# Patient Record
Sex: MALE | Race: WHITE | Employment: FULL TIME | URBAN - METROPOLITAN AREA
[De-identification: names, ages, dates, MRNs, and addresses within clinical notes are randomized per-mention and may not be internally consistent; named-entity substitution may affect disease eponyms.]

---

## 2021-03-24 ENCOUNTER — HOSPITAL ENCOUNTER (EMERGENCY)
Age: 51
Discharge: HOME | End: 2021-03-24
Attending: EMERGENCY MEDICINE
Payer: COMMERCIAL

## 2021-03-24 VITALS
RESPIRATION RATE: 16 BRPM | HEART RATE: 74 BPM | WEIGHT: 315 LBS | SYSTOLIC BLOOD PRESSURE: 138 MMHG | TEMPERATURE: 98.7 F | BODY MASS INDEX: 39.17 KG/M2 | HEIGHT: 75 IN | DIASTOLIC BLOOD PRESSURE: 84 MMHG | OXYGEN SATURATION: 98 %

## 2021-03-24 DIAGNOSIS — S91.209A AVULSION OF TOENAIL, INITIAL ENCOUNTER: Primary | ICD-10-CM

## 2021-03-24 PROCEDURE — 90715 TDAP VACCINE 7 YRS/> IM: CPT | Performed by: EMERGENCY MEDICINE

## 2021-03-24 PROCEDURE — 2500000003 HC RX 250 WO HCPCS: Performed by: EMERGENCY MEDICINE

## 2021-03-24 PROCEDURE — 6360000002 HC RX W HCPCS: Performed by: EMERGENCY MEDICINE

## 2021-03-24 PROCEDURE — 11730 AVULSION NAIL PLATE SIMPLE 1: CPT

## 2021-03-24 PROCEDURE — 99282 EMERGENCY DEPT VISIT SF MDM: CPT

## 2021-03-24 PROCEDURE — 90471 IMMUNIZATION ADMIN: CPT | Performed by: EMERGENCY MEDICINE

## 2021-03-24 RX ORDER — LIDOCAINE HYDROCHLORIDE 20 MG/ML
5 INJECTION, SOLUTION INFILTRATION; PERINEURAL ONCE
Status: COMPLETED | OUTPATIENT
Start: 2021-03-24 | End: 2021-03-24

## 2021-03-24 RX ADMIN — TETANUS TOXOID, REDUCED DIPHTHERIA TOXOID AND ACELLULAR PERTUSSIS VACCINE, ADSORBED 0.5 ML: 5; 2.5; 8; 8; 2.5 SUSPENSION INTRAMUSCULAR at 22:31

## 2021-03-24 RX ADMIN — LIDOCAINE HYDROCHLORIDE 5 ML: 20 INJECTION, SOLUTION INFILTRATION; PERINEURAL at 22:30

## 2021-03-24 ASSESSMENT — ENCOUNTER SYMPTOMS
WHEEZING: 0
PHOTOPHOBIA: 0
SHORTNESS OF BREATH: 0
VOMITING: 0
COLOR CHANGE: 0
RHINORRHEA: 0
BACK PAIN: 0
NAUSEA: 0

## 2021-03-25 NOTE — ED NOTES
Pt dc/d with instructions in stable condition, ambulatory to lobby. Home per ride.       Clarice Barron RN  03/24/21 9867

## 2021-03-25 NOTE — ED PROVIDER NOTES
[address] [facility]  EMERGENCY DEPARTMENTENCOUNTER      Pt Name: Adilia Max  MRN: [id number]  [place] 1970  Date ofevaluation: 3/24/2021  Provider: Ronal Roberts MD    CHIEF COMPLAINT       Chief Complaint   Patient presents with    Toe Injury     unroofed great toe nail left ft         HISTORY OF PRESENT ILLNESS   (Location/Symptom, Timing/Onset,Context/Setting, Quality, Duration, Modifying Factors, Severity)  Note limiting factors. Adilia Max is a 53 y.o. male  who  has a past medical history of Asthma. who presents to the emergency department for evaluation of an injury to the left first toe. Patient reports getting off his cat and striking his toe on an ottoman. He is almost completely avulsed the toenail of the left toe. Denies loss of sensation or motor function. Denies loss of movement. Denies pain at the interphalangeal or MTP joint. Denies ankle pain or knee pain. He is unsure when his last tetanus shot was. HPI    NursingNotes were reviewed. REVIEW OF SYSTEMS    (2-9 systems for level 4, 10 or more for level 5)     Review of Systems   Constitutional: Negative for activity change, chills and fever. HENT: Negative for congestion and rhinorrhea. Eyes: Negative for photophobia and visual disturbance. Respiratory: Negative for shortness of breath and wheezing. Cardiovascular: Negative for palpitations and leg swelling. Gastrointestinal: Negative for nausea and vomiting. Endocrine: Negative for polydipsia and polyuria. Genitourinary: Negative for difficulty urinating and frequency. Musculoskeletal: Negative for back pain and gait problem. Skin: Positive for wound. Negative for color change and rash. Neurological: Negative for light-headedness and headaches. Psychiatric/Behavioral: Negative for confusion. The patient is not nervous/anxious. All other systems reviewed and are negative.       Except as noted above the remainder of the review of systems was reviewed and negative. PAST MEDICAL HISTORY     Past Medical History:   Diagnosis Date    Asthma          SURGICALHISTORY       Past Surgical History:   Procedure Laterality Date    SHOULDER SURGERY       Right rotator cuff         CURRENT MEDICATIONS       Previous Medications    No medications on file            Penicillins    FAMILY HISTORY     History reviewed. No pertinent family history. SOCIAL HISTORY       Social History     Socioeconomic History    Marital status:      Spouse name: None    Number of children: None    Years of education: None    Highest education level: None   Occupational History    None   Social Needs    Financial resource strain: None    Food insecurity     Worry: None     Inability: None    Transportation needs     Medical: None     Non-medical: None   Tobacco Use    Smoking status: Never Smoker    Smokeless tobacco: Never Used   Substance and Sexual Activity    Alcohol use: Yes     Comment: rare    Drug use: None    Sexual activity: None   Lifestyle    Physical activity     Days per week: None     Minutes per session: None    Stress: None   Relationships    Social connections     Talks on phone: None     Gets together: None     Attends Voodoo service: None     Active member of club or organization: None     Attends meetings of clubs or organizations: None     Relationship status: None    Intimate partner violence     Fear of current or ex partner: None     Emotionally abused: None     Physically abused: None     Forced sexual activity: None   Other Topics Concern    None   Social History Narrative    None       SCREENINGS             PHYSICAL EXAM    (up to 7 for level 4, 8 or more for level 5)     ED Triage Vitals [03/24/21 2156]   BP Temp Temp Source Pulse Resp SpO2 Height Weight   138/84 98.7 °F (37.1 °C) Oral 74 16 98 % 6' 3\" (1.905 m) (!) 350 lb 9.6 oz (159 kg)       Physical Exam  Vitals signs and nursing note reviewed. Constitutional:       General: He is not in acute distress. Appearance: He is well-developed. HENT:      Head: Normocephalic and atraumatic. Eyes:      Conjunctiva/sclera: Conjunctivae normal.   Neck:      Musculoskeletal: Normal range of motion. Trachea: No tracheal deviation. Cardiovascular:      Rate and Rhythm: Normal rate and regular rhythm. Pulmonary:      Effort: Pulmonary effort is normal.      Breath sounds: Normal breath sounds. No wheezing or rales. Abdominal:      General: There is no distension. Palpations: Abdomen is soft. Tenderness: There is no abdominal tenderness. Musculoskeletal: Normal range of motion. General: Tenderness and signs of injury present. No deformity. Comments: Nail avulsion of the first toe of the left foot. Skin:     General: Skin is warm and dry. Capillary Refill: Capillary refill takes less than 2 seconds. Coloration: Skin is not pale. Findings: Lesion present. No erythema. Neurological:      Mental Status: He is alert and oriented to person, place, and time. RESULTS     EKG: All EKG's are interpreted by the Emergency Department Physician who either signs or Co-signsthis chart in the absence of a cardiologist.      RADIOLOGY:   Tamanna [place] such as CT, Ultrasound and MRI are read by the radiologist. Plain radiographic images are visualized and preliminarily interpreted by the emergency physician with the below findings:      Interpretation per the Radiologist below, if available at the time ofthis note:    No orders to display         ED BEDSIDE ULTRASOUND:   Performed by ED Physician - none    LABS:  Labs Reviewed - No data to display    All other labs were within normal range or not returned as of this dictation.     EMERGENCY DEPARTMENT COURSE and DIFFERENTIAL DIAGNOSIS/MDM:   Vitals:    Vitals:    03/24/21 2156   BP: 138/84   Pulse: 74   Resp: 16   Temp: 98.7 °F (37.1 °C)   TempSrc: Oral   SpO2: procedures. There was a high probability ofclinically significant/life threatening deterioration in the patient's condition which required my urgent intervention. CONSULTS:  None    PROCEDURES:  Unless otherwise noted below, none     Nail Removal    Date/Time: 3/24/2021 11:27 PM  Performed by: Sharon Rosa MD  Authorized by: Sharon Rosa MD     Consent:     Consent obtained:  Verbal    Consent given by:  Patient    Risks discussed:  Bleeding, incomplete removal, infection, pain and permanent nail deformity    Alternatives discussed:  No treatment and delayed treatment  Location:     Foot:  L big toe  Pre-procedure details:     Skin preparation:  Alcohol    Preparation: Patient was prepped and draped in the usual sterile fashion    Anesthesia (see MAR for exact dosages): Anesthesia method:  Nerve block    Block needle gauge:  30 G    Block anesthetic:  Lidocaine 1% w/o epi    Block technique:  Digitial    Block injection procedure:  Anatomic landmarks identified, introduced needle and incremental injection    Block outcome:  Anesthesia achieved  Nail Removal:     Nail removed:  Partial    Nail bed repaired: yes      Nail bed repair material:  Tissue adhesive    Removed nail replaced and anchored: no    Trephination:     Subungual hematoma drained: no    Ingrown nail:     Wedge excision of skin: no      Nail matrix removed or ablated:  None  Post-procedure details:     Dressing:  Tube gauze and non-adhesive packing strip    Patient tolerance of procedure: Tolerated well, no immediate complications        FINAL IMPRESSION      1.  Avulsion of toenail, initial encounter          DISPOSITION/PLAN   DISPOSITION Decision To Discharge 03/24/2021 10:55:24 PM      PATIENT REFERREDTO:  [facility]) Pre-Services  [phone]          DISCHARGEMEDICATIONS:  New Prescriptions    No medications on file          (Please note that portions of this note were completed with a voice recognition program.  Efforts were made to edit the dictations but occasionally words are mis-transcribed.)    Ashwini Hernandez MD (electronically signed)  Attending Emergency Physician          Ashwini Hernandez MD  03/24/21 Naldo Monahan MD  03/24/21 [id number]

## 2022-04-04 LAB
CHOLESTEROL, TOTAL: 134 MG/DL (ref 0–199)
GLUCOSE BLD-MCNC: 97 MG/DL (ref 70–99)
HDLC SERPL-MCNC: 47 MG/DL (ref 40–60)
LDL CHOLESTEROL CALCULATED: 79 MG/DL
TRIGL SERPL-MCNC: 42 MG/DL (ref 0–150)

## 2022-07-29 LAB
CHOLESTEROL, TOTAL: 122 MG/DL (ref 0–199)
GLUCOSE BLD-MCNC: 95 MG/DL (ref 70–99)
HDLC SERPL-MCNC: 52 MG/DL (ref 40–60)
LDL CHOLESTEROL CALCULATED: 63 MG/DL
TRIGL SERPL-MCNC: 37 MG/DL (ref 0–150)

## 2023-07-28 LAB
CHOLEST SERPL-MCNC: 129 MG/DL (ref 0–199)
GLUCOSE SERPL-MCNC: 53 MG/DL (ref 70–99)
HDLC SERPL-MCNC: 49 MG/DL (ref 40–60)
LDLC SERPL CALC-MCNC: 68 MG/DL
TRIGL SERPL-MCNC: 60 MG/DL (ref 0–150)

## 2024-04-03 LAB
CHOLEST SERPL-MCNC: 140 MG/DL (ref 0–199)
GLUCOSE SERPL-MCNC: 99 MG/DL (ref 70–99)
HDLC SERPL-MCNC: 53 MG/DL (ref 40–60)
LDLC SERPL CALC-MCNC: 75 MG/DL
TRIGL SERPL-MCNC: 59 MG/DL (ref 0–150)

## 2025-03-06 LAB
CHOLEST SERPL-MCNC: 143 MG/DL (ref 0–199)
GLUCOSE SERPL-MCNC: 111 MG/DL (ref 70–99)
HDLC SERPL-MCNC: 48 MG/DL (ref 40–60)
LDLC SERPL CALC-MCNC: 84 MG/DL
TRIGL SERPL-MCNC: 56 MG/DL (ref 0–150)

## 2025-04-07 SDOH — HEALTH STABILITY: PHYSICAL HEALTH: ON AVERAGE, HOW MANY DAYS PER WEEK DO YOU ENGAGE IN MODERATE TO STRENUOUS EXERCISE (LIKE A BRISK WALK)?: 5 DAYS

## 2025-04-07 SDOH — HEALTH STABILITY: PHYSICAL HEALTH: ON AVERAGE, HOW MANY MINUTES DO YOU ENGAGE IN EXERCISE AT THIS LEVEL?: 80 MIN

## 2025-04-08 ASSESSMENT — ENCOUNTER SYMPTOMS
VOMITING: 0
NAUSEA: 0
DIARRHEA: 0
CONSTIPATION: 0
SHORTNESS OF BREATH: 0

## 2025-04-08 NOTE — PROGRESS NOTES
[facility]  Family and [facility]  [address], [address], [place] [address]  Phone: [phone]                                   Name:  Brian Brewer  :    1970    HPI:     Brian Brewer is a 54 y.o. male who  has a past medical history of Asthma. He presents today to establish care. Chronic conditions and acute concerns discussed below. New patient history was obtained.    Concern for low testosterone  -Decrease in energy and libido over last year  -Sometimes difficulty getting an erection but no other issues    Concern for sleep apnea  -Snoring per wife  -Sometimes feels like he could fall asleep during the day  -Some trouble staying asleep  -Gets up to pee once a night    BMI 43  -Gym 4-5 days a week, 20-30 minutes of cardio a few days a week and lifting  -Protein yogurt and banana for breakfast  -Chicken salad or caesar salad for lunch  -Half of the week healthy dinner, sometimes eating out  -Eating lots of sweets    Weaker urinary stream  -Urinary stream is not as strong  -Dribbling  -No urinary urgency  -No sensation of incomplete emptying    Past Medical History:    Past Medical History:   Diagnosis Date    Asthma      Past Surgical History:  Past Surgical History:   Procedure Laterality Date    SHOULDER SURGERY       Right rotator cuff     Home Meds:  Prior to Visit Medications    Medication Sig Taking? Authorizing Provider   Semaglutide-Weight Management (WEGOVY) 0.25 MG/0.5ML SOAJ SC injection Inject 0.25 mg into the skin every 7 days Yes Gary Georges,    albuterol sulfate HFA (PROVENTIL;VENTOLIN;PROAIR) 108 (90 Base) MCG/ACT inhaler Inhale 2 puffs into the lungs as needed  Provider, MD Rossi     Allergies:    Penicillins  Family History:       Problem Relation Age of Onset    Arthritis Mother     Diabetes Mother     Heart Disease Mother     High Blood Pressure Mother      Social History:  Social History       Tobacco History

## 2025-04-10 ENCOUNTER — OFFICE VISIT (OUTPATIENT)
Dept: PRIMARY CARE CLINIC | Age: 55
End: 2025-04-10

## 2025-04-10 VITALS
OXYGEN SATURATION: 95 % | HEIGHT: 75 IN | BODY MASS INDEX: 39.17 KG/M2 | HEART RATE: 71 BPM | SYSTOLIC BLOOD PRESSURE: 122 MMHG | RESPIRATION RATE: 16 BRPM | DIASTOLIC BLOOD PRESSURE: 78 MMHG | WEIGHT: 315 LBS

## 2025-04-10 DIAGNOSIS — Z13.21 SCREENING FOR ENDOCRINE, NUTRITIONAL, METABOLIC AND IMMUNITY DISORDER: ICD-10-CM

## 2025-04-10 DIAGNOSIS — Z13.29 SCREENING FOR ENDOCRINE, NUTRITIONAL, METABOLIC AND IMMUNITY DISORDER: ICD-10-CM

## 2025-04-10 DIAGNOSIS — R68.82 DECREASED LIBIDO: ICD-10-CM

## 2025-04-10 DIAGNOSIS — R06.83 SNORING: ICD-10-CM

## 2025-04-10 DIAGNOSIS — R39.12 WEAK URINARY STREAM: ICD-10-CM

## 2025-04-10 DIAGNOSIS — Z76.89 ENCOUNTER TO ESTABLISH CARE: ICD-10-CM

## 2025-04-10 DIAGNOSIS — R53.83 OTHER FATIGUE: ICD-10-CM

## 2025-04-10 DIAGNOSIS — Z12.11 SCREEN FOR COLON CANCER: ICD-10-CM

## 2025-04-10 DIAGNOSIS — Z00.00 HEALTHCARE MAINTENANCE: ICD-10-CM

## 2025-04-10 DIAGNOSIS — Z13.0 SCREENING FOR ENDOCRINE, NUTRITIONAL, METABOLIC AND IMMUNITY DISORDER: ICD-10-CM

## 2025-04-10 DIAGNOSIS — Z13.228 SCREENING FOR ENDOCRINE, NUTRITIONAL, METABOLIC AND IMMUNITY DISORDER: ICD-10-CM

## 2025-04-10 RX ORDER — ALBUTEROL SULFATE 90 UG/1
2 INHALANT RESPIRATORY (INHALATION) PRN
COMMUNITY

## 2025-04-10 SDOH — ECONOMIC STABILITY: FOOD INSECURITY: WITHIN THE PAST 12 MONTHS, YOU WORRIED THAT YOUR FOOD WOULD RUN OUT BEFORE YOU GOT MONEY TO BUY MORE.: NEVER TRUE

## 2025-04-10 SDOH — ECONOMIC STABILITY: FOOD INSECURITY: WITHIN THE PAST 12 MONTHS, THE FOOD YOU BOUGHT JUST DIDN'T LAST AND YOU DIDN'T HAVE MONEY TO GET MORE.: NEVER TRUE

## 2025-04-10 ASSESSMENT — PATIENT HEALTH QUESTIONNAIRE - PHQ9
1. LITTLE INTEREST OR PLEASURE IN DOING THINGS: NOT AT ALL
2. FEELING DOWN, DEPRESSED OR HOPELESS: NOT AT ALL
SUM OF ALL RESPONSES TO PHQ QUESTIONS 1-9: 0

## 2025-04-14 ENCOUNTER — HOSPITAL ENCOUNTER (OUTPATIENT)
Age: 55
Discharge: HOME | End: 2025-04-14
Payer: COMMERCIAL

## 2025-04-14 ENCOUNTER — RESULTS FOLLOW-UP (OUTPATIENT)
Dept: PRIMARY CARE CLINIC | Age: 55
End: 2025-04-14

## 2025-04-14 DIAGNOSIS — G47.39 SLEEP APNEA-LIKE BEHAVIOR: ICD-10-CM

## 2025-04-14 DIAGNOSIS — Z91.89 AT RISK FOR SLEEP APNEA: ICD-10-CM

## 2025-04-14 DIAGNOSIS — Z13.0 SCREENING FOR ENDOCRINE, NUTRITIONAL, METABOLIC AND IMMUNITY DISORDER: ICD-10-CM

## 2025-04-14 DIAGNOSIS — Z13.228 SCREENING FOR ENDOCRINE, NUTRITIONAL, METABOLIC AND IMMUNITY DISORDER: ICD-10-CM

## 2025-04-14 DIAGNOSIS — Z13.29 SCREENING FOR ENDOCRINE, NUTRITIONAL, METABOLIC AND IMMUNITY DISORDER: ICD-10-CM

## 2025-04-14 DIAGNOSIS — R53.83 OTHER FATIGUE: ICD-10-CM

## 2025-04-14 DIAGNOSIS — R68.82 DECREASED LIBIDO: ICD-10-CM

## 2025-04-14 DIAGNOSIS — Z13.21 SCREENING FOR ENDOCRINE, NUTRITIONAL, METABOLIC AND IMMUNITY DISORDER: ICD-10-CM

## 2025-04-14 LAB
25(OH)D3 SERPL-MCNC: 51.2 NG/ML
ALBUMIN SERPL-MCNC: 4.1 G/DL (ref 3.4–5)
ALBUMIN/GLOB SERPL: 1.5 {RATIO} (ref 1.1–2.2)
ALP SERPL-CCNC: 77 U/L (ref 40–129)
ALT SERPL-CCNC: 27 U/L (ref 10–40)
ANION GAP SERPL CALCULATED.3IONS-SCNC: 9 MMOL/L (ref 3–16)
AST SERPL-CCNC: 23 U/L (ref 15–37)
BASOPHILS # BLD: 0 K/UL (ref 0–0.2)
BASOPHILS NFR BLD: 0.6 %
BILIRUB SERPL-MCNC: 1.1 MG/DL (ref 0–1)
BUN SERPL-MCNC: 17 MG/DL (ref 7–20)
CALCIUM SERPL-MCNC: 9.1 MG/DL (ref 8.3–10.6)
CHLORIDE SERPL-SCNC: 104 MMOL/L (ref 99–110)
CO2 SERPL-SCNC: 26 MMOL/L (ref 21–32)
CREAT SERPL-MCNC: 1.2 MG/DL (ref 0.9–1.3)
DEPRECATED RDW RBC AUTO: 14.1 % (ref 12.4–15.4)
EOSINOPHIL # BLD: 0.3 K/UL (ref 0–0.6)
EOSINOPHIL NFR BLD: 3.5 %
EST. AVERAGE GLUCOSE BLD GHB EST-MCNC: 102.5 MG/DL
FOLATE SERPL-MCNC: 9.32 NG/ML (ref 4.78–24.2)
GFR SERPLBLD CREATININE-BSD FMLA CKD-EPI: 72 ML/MIN/{1.73_M2}
GLUCOSE SERPL-MCNC: 95 MG/DL (ref 70–99)
HBA1C MFR BLD: 5.2 %
HCT VFR BLD AUTO: 47.4 % (ref 40.5–52.5)
HGB BLD-MCNC: 16.2 G/DL (ref 13.5–17.5)
LYMPHOCYTES # BLD: 1.6 K/UL (ref 1–5.1)
LYMPHOCYTES NFR BLD: 20.7 %
MCH RBC QN AUTO: 29.8 PG (ref 26–34)
MCHC RBC AUTO-ENTMCNC: 34.2 G/DL (ref 31–36)
MCV RBC AUTO: 87.1 FL (ref 80–100)
MONOCYTES # BLD: 0.8 K/UL (ref 0–1.3)
MONOCYTES NFR BLD: 10.3 %
NEUTROPHILS # BLD: 5 K/UL (ref 1.7–7.7)
NEUTROPHILS NFR BLD: 64.9 %
PLATELET # BLD AUTO: 233 K/UL (ref 135–450)
PMV BLD AUTO: 8.5 FL (ref 5–10.5)
POTASSIUM SERPL-SCNC: 4.1 MMOL/L (ref 3.5–5.1)
PROT SERPL-MCNC: 6.9 G/DL (ref 6.4–8.2)
RBC # BLD AUTO: 5.45 M/UL (ref 4.2–5.9)
SODIUM SERPL-SCNC: 139 MMOL/L (ref 136–145)
TSH SERPL DL<=0.005 MIU/L-ACNC: 3.13 UIU/ML (ref 0.27–4.2)
VIT B12 SERPL-MCNC: 376 PG/ML (ref 211–911)
WBC # BLD AUTO: 7.7 K/UL (ref 4–11)

## 2025-04-14 PROCEDURE — 82306 VITAMIN D 25 HYDROXY: CPT

## 2025-04-14 PROCEDURE — 36415 COLL VENOUS BLD VENIPUNCTURE: CPT

## 2025-04-14 PROCEDURE — 84403 ASSAY OF TOTAL TESTOSTERONE: CPT

## 2025-04-14 PROCEDURE — 84443 ASSAY THYROID STIM HORMONE: CPT

## 2025-04-14 PROCEDURE — 84270 ASSAY OF SEX HORMONE GLOBUL: CPT

## 2025-04-14 PROCEDURE — 82746 ASSAY OF FOLIC ACID SERUM: CPT

## 2025-04-14 PROCEDURE — 83036 HEMOGLOBIN GLYCOSYLATED A1C: CPT

## 2025-04-14 PROCEDURE — 80053 COMPREHEN METABOLIC PANEL: CPT

## 2025-04-14 PROCEDURE — 85025 COMPLETE CBC W/AUTO DIFF WBC: CPT

## 2025-04-14 PROCEDURE — 82607 VITAMIN B-12: CPT

## 2025-04-14 NOTE — RESULT ENCOUNTER NOTE
Please let patient know that his labs were normal.  Still waiting on testosterone level and will update once that result comes back.

## 2025-04-14 NOTE — RESULT ENCOUNTER NOTE
Noted.  Personally sent patient a message discussing new prescription of Zepbound.  Patient will update on whether insurance covers this or not and will let us know if he decides to pay for the Wegovy or Zepbound out-of-pocket.

## 2025-04-16 LAB
SHBG SERPL-SCNC: 35 NMOL/L (ref 19–76)
TESTOST FREE SERPL-MCNC: 107 PG/ML (ref 47–244)
TESTOST SERPL-MCNC: 524 NG/DL (ref 193–740)

## 2025-04-21 ASSESSMENT — SLEEP AND FATIGUE QUESTIONNAIRES
HOW LIKELY ARE YOU TO NOD OFF OR FALL ASLEEP WHILE SITTING AND READING: SLIGHT CHANCE OF DOZING
HOW LIKELY ARE YOU TO NOD OFF OR FALL ASLEEP WHILE SITTING INACTIVE IN A PUBLIC PLACE: WOULD NEVER DOZE
HOW LIKELY ARE YOU TO NOD OFF OR FALL ASLEEP WHILE LYING DOWN TO REST IN THE AFTERNOON WHEN CIRCUMSTANCES PERMIT: HIGH CHANCE OF DOZING
HOW LIKELY ARE YOU TO NOD OFF OR FALL ASLEEP WHILE SITTING INACTIVE IN A PUBLIC PLACE: WOULD NEVER DOZE
HOW LIKELY ARE YOU TO NOD OFF OR FALL ASLEEP WHILE WATCHING TV: SLIGHT CHANCE OF DOZING
ESS TOTAL SCORE: 7
HOW LIKELY ARE YOU TO NOD OFF OR FALL ASLEEP WHILE SITTING AND TALKING TO SOMEONE: WOULD NEVER DOZE
HOW LIKELY ARE YOU TO NOD OFF OR FALL ASLEEP IN A CAR, WHILE STOPPED FOR A FEW MINUTES IN TRAFFIC: WOULD NEVER DOZE
HOW LIKELY ARE YOU TO NOD OFF OR FALL ASLEEP WHILE SITTING QUIETLY AFTER LUNCH WITHOUT ALCOHOL: SLIGHT CHANCE OF DOZING
HOW LIKELY ARE YOU TO NOD OFF OR FALL ASLEEP WHILE WATCHING TV: SLIGHT CHANCE OF DOZING
HOW LIKELY ARE YOU TO NOD OFF OR FALL ASLEEP WHEN YOU ARE A PASSENGER IN A CAR FOR AN HOUR WITHOUT A BREAK: SLIGHT CHANCE OF DOZING
HOW LIKELY ARE YOU TO NOD OFF OR FALL ASLEEP WHILE SITTING AND TALKING TO SOMEONE: WOULD NEVER DOZE
HOW LIKELY ARE YOU TO NOD OFF OR FALL ASLEEP WHILE SITTING QUIETLY AFTER LUNCH WITHOUT ALCOHOL: SLIGHT CHANCE OF DOZING
HOW LIKELY ARE YOU TO NOD OFF OR FALL ASLEEP WHILE LYING DOWN TO REST IN THE AFTERNOON WHEN CIRCUMSTANCES PERMIT: HIGH CHANCE OF DOZING
HOW LIKELY ARE YOU TO NOD OFF OR FALL ASLEEP IN A CAR, WHILE STOPPED FOR A FEW MINUTES IN TRAFFIC: WOULD NEVER DOZE
HOW LIKELY ARE YOU TO NOD OFF OR FALL ASLEEP WHEN YOU ARE A PASSENGER IN A CAR FOR AN HOUR WITHOUT A BREAK: SLIGHT CHANCE OF DOZING

## 2025-04-23 ENCOUNTER — OFFICE VISIT (OUTPATIENT)
Dept: PULMONOLOGY | Age: 55
End: 2025-04-23
Payer: COMMERCIAL

## 2025-04-23 VITALS
RESPIRATION RATE: 20 BRPM | WEIGHT: 315 LBS | SYSTOLIC BLOOD PRESSURE: 143 MMHG | HEART RATE: 82 BPM | BODY MASS INDEX: 39.17 KG/M2 | OXYGEN SATURATION: 98 % | HEIGHT: 75 IN | DIASTOLIC BLOOD PRESSURE: 79 MMHG

## 2025-04-23 DIAGNOSIS — R06.83 SNORING: ICD-10-CM

## 2025-04-23 DIAGNOSIS — R06.81 WITNESSED APNEIC SPELLS: Primary | ICD-10-CM

## 2025-04-23 DIAGNOSIS — R53.82 CHRONIC FATIGUE: ICD-10-CM

## 2025-04-23 PROCEDURE — 99203 OFFICE O/P NEW LOW 30 MIN: CPT

## 2025-04-23 NOTE — PROGRESS NOTES
SLEEP MEDICINE CONSULT NOTE  CC: Snoring  Referring Provider: Patient is being seen at the request of Dr. Gary Georges & Dr. Lesa Moreno, for a consultation to evaluate for Obstructive Sleep Apnea.    Presenting HPI 4/23/25:     History of Present Illness  55 yo male retired  presents for SANIYA evaluation due to observed apneas by his wife & chronic fatigue discussed with his PCP. +dry mouth upon awakening worse x a few years.Overall dissatisfied with his sleep quality with difficulty falling & staying asleep. Worked 3rd shift for 17 yrs, transitioned to day shift 8 yrs ago. Inconsistent sleep latency, usually difficult to fall asleep taking 30-45 mins, aided with melatonin. 2-4 awakenings/night, to restroom 1x/night. Frequent tossing & turning due to shoulder pain. To bed ~9p, OOB 5:10 AM. On weekends, to bed 10:30p & sleeps in until at least 8 AM. Maintains a regular gym schedule, exercising 5 days a week. Does not usually feel refreshed upon waking, but discipline gets him out of bed. His ideal is 7 to 8 hours of sleep/night. No RLS. No EDS.  No car wrecks/near wrecks because of the sleepiness or nodding off while driving.  ESS is: 7.   Drinks 3 caffeinated beverages/day (including pre-workout).  Possible family h/o SANIYA undx'd in his father; +RLS in mother.     Past Medical History:   Diagnosis Date    Asthma      Past Surgical History:   Procedure Laterality Date    SHOULDER SURGERY       Right rotator cuff     Medication list was reviewed and updated as needed in Epic.   reports that he quit smoking about 9 years ago. His smoking use included cigarettes. He started smoking about 14 years ago. He has a 2.4 pack-year smoking history. He has never used smokeless tobacco.  family history includes Arthritis in his mother; Diabetes in his mother; Heart Disease in his mother; High Blood Pressure in his mother.    Review of Systems: see HPI and attached review of system sheet.     PHYSICAL

## 2025-04-23 NOTE — PATIENT INSTRUCTIONS
What's next:  Schedule a study with the sleep lab (call them at [phone] if you do not receive their call.)    Read through the sleep hygiene tips below to see what kind of changes you can start working on now  We will meet after your sleep study to discuss results, options and recommendations from there     It was great to meet you and take care Brian!  -Ree     ______________________________________________________________________  Never drive a car or operate a motorized vehicle while drowsy or sleepy.   ______________________________________________________________________       Sleep Center/Lab Phone #: [phone]                 [facility] location:  [address] [place] [address], [address] [address]  [facility] location:  [address], [address], [place] [address]    For in-lab testing: please bring with you:  Appropriate nightclothes (pajamas, sweats, etc.), slippers and robe  All medications you will need during you stay, including any breathing medications, nebulizers and metered dose inhalers  Your own toiletries and hairdryer if you wish to shower before you leave  Current photo I.D. and Insurance card  Please note that:   Arrival time for your sleep study is approximately 8:30 pm and most patients have completed their study by 6 am the following morning.    We do not allow any pillows or bed linens from home due to health regulations  We recommend that you not bring valuables with you to the sleep center, as we cannot be responsible for any lost or damaged personal items  There is no smoking allowed anywhere on [facility] at any time  Each patient room is a private room with a private bathroom  The test itself is painless and not invasive in any way; it consists of electrodes and sensors attached to specific areas of your scalp, face, chest and legs.  We will also monitor respiratory effort, nasal and oral airflow and oxygen levels.  The test will not hurt- it is painless

## 2025-04-29 ENCOUNTER — HOSPITAL ENCOUNTER (OUTPATIENT)
Dept: SLEEP CENTER | Age: 55
Discharge: HOME | End: 2025-05-01
Payer: COMMERCIAL

## 2025-04-29 DIAGNOSIS — R06.81 WITNESSED APNEIC SPELLS: ICD-10-CM

## 2025-04-29 DIAGNOSIS — R53.82 CHRONIC FATIGUE: ICD-10-CM

## 2025-04-29 DIAGNOSIS — R06.83 SNORING: ICD-10-CM

## 2025-04-29 PROCEDURE — 95806 SLEEP STUDY UNATT&RESP EFFT: CPT

## 2025-05-01 ENCOUNTER — TELEPHONE (OUTPATIENT)
Dept: PRIMARY CARE CLINIC | Age: 55
End: 2025-05-01

## 2025-05-01 ENCOUNTER — RESULTS FOLLOW-UP (OUTPATIENT)
Dept: PULMONOLOGY | Age: 55
End: 2025-05-01

## 2025-05-01 DIAGNOSIS — Z91.89 AT RISK FOR SLEEP APNEA: ICD-10-CM

## 2025-05-01 DIAGNOSIS — G47.39 SLEEP APNEA-LIKE BEHAVIOR: ICD-10-CM

## 2025-05-01 PROBLEM — R06.81 WITNESSED APNEIC SPELLS: Status: ACTIVE | Noted: 2025-05-01

## 2025-05-01 PROBLEM — R06.83 SNORING: Status: ACTIVE | Noted: 2025-05-01

## 2025-05-01 PROBLEM — R53.82 CHRONIC FATIGUE: Status: ACTIVE | Noted: 2025-05-01

## 2025-05-01 NOTE — TELEPHONE ENCOUNTER
Patient did not read [employer] message.  Please call and let him know the following:     It is possible that your symptoms with low energy may be related to the sleep apnea.  I saw that you scheduled with sleep medicine on 4/23.  I would keep this appointment and see what they have to say.  Hopefully with what ever treatment they offer you, this may improve some of your symptoms.     Regarding the weight loss medication, I'm sorry the medication was so expensive!  Unfortunately there is only so many options within the GLP-1 class.  I can try sending namebrand Ozempic to see if that would be covered but it probably will be since the others were not covered.  There is another medication called Contrave that may potentially be something that could work.  If you want I do some research on those and let me know if you have a specific preference, we can try one of those.

## 2025-05-01 NOTE — TELEPHONE ENCOUNTER
Please let patient know that I am resending the Zepbound and it should go through [facility] this time.  It looks like his home sleep study showed severe obstructive sleep apnea so hopefully this will be another reason for insurance to cover the Zepbound.

## 2025-05-01 NOTE — TELEPHONE ENCOUNTER
Patient is calling and stating that a PA is needed for RX   tirzepatide-weight management (ZEPBOUND) 2.5 MG/0.5ML SOAJ subCUTAneous auto-injector pen . Please advise

## 2025-05-01 NOTE — TELEPHONE ENCOUNTER
There is already a prior authorization request in progress for this medication.    tirzepatide-weight management (ZEPBOUND) 2.5 MG/0.5ML SOAJ subCUTAneous auto-injector pen     [phone]    Notified patient of message.  Patient stated understanding.

## 2025-05-02 NOTE — TELEPHONE ENCOUNTER
Submitted PA for Zepbound 2.5MG/0.5ML pen-injectors  Via CMM Key: [id number] STATUS: Medication is a PLAN EXCLUSION across the board.     Please notify patient. Thank you.

## 2025-05-12 ENCOUNTER — OFFICE VISIT (OUTPATIENT)
Dept: PRIMARY CARE CLINIC | Age: 55
End: 2025-05-12
Payer: COMMERCIAL

## 2025-05-12 VITALS
HEIGHT: 75 IN | HEART RATE: 68 BPM | WEIGHT: 315 LBS | DIASTOLIC BLOOD PRESSURE: 70 MMHG | BODY MASS INDEX: 39.17 KG/M2 | OXYGEN SATURATION: 96 % | SYSTOLIC BLOOD PRESSURE: 108 MMHG

## 2025-05-12 DIAGNOSIS — J45.909 UNCOMPLICATED ASTHMA, UNSPECIFIED ASTHMA SEVERITY, UNSPECIFIED WHETHER PERSISTENT: ICD-10-CM

## 2025-05-12 DIAGNOSIS — R53.82 CHRONIC FATIGUE: ICD-10-CM

## 2025-05-12 DIAGNOSIS — H92.13 EAR DISCHARGE OF BOTH EARS: ICD-10-CM

## 2025-05-12 DIAGNOSIS — M25.50 ARTHRALGIA, UNSPECIFIED JOINT: ICD-10-CM

## 2025-05-12 DIAGNOSIS — R06.81 WITNESSED APNEIC SPELLS: ICD-10-CM

## 2025-05-12 DIAGNOSIS — Z00.00 ENCOUNTER FOR WELL ADULT EXAM WITHOUT ABNORMAL FINDINGS: Primary | ICD-10-CM

## 2025-05-12 DIAGNOSIS — Z00.00 HEALTHCARE MAINTENANCE: ICD-10-CM

## 2025-05-12 PROCEDURE — 99396 PREV VISIT EST AGE 40-64: CPT

## 2025-05-12 ASSESSMENT — ENCOUNTER SYMPTOMS
NAUSEA: 0
SHORTNESS OF BREATH: 1
VOMITING: 0
DIARRHEA: 0
CHEST TIGHTNESS: 0
BACK PAIN: 1

## 2025-05-12 NOTE — PROGRESS NOTES
Administered    COVID-19, PFIZER GRAY top, DO NOT Dilute, (age 12 y+), IM, 30 mcg/0.3 mL 2022    COVID-19, PFIZER PURPLE top, DILUTE for use, (age 12 y+), 30mcg/0.3mL 2021, 2021    Influenza Virus Vaccine 2021    TDaP, ADACEL (age 10y-64y), BOOSTRIX (age 10y+), IM, 0.5mL 2021       Allergies   Allergen Reactions    Penicillins      Current Outpatient Medications   Medication Sig Dispense Refill    tirzepatide-weight management (ZEPBOUND) 2.5 MG/0.5ML SOAJ subCUTAneous auto-injector pen Inject 2.5 mg into the skin every 7 days (Patient not taking: Reported on 2025) 2 mL 0    albuterol sulfate HFA (PROVENTIL;VENTOLIN;PROAIR) 108 (90 Base) MCG/ACT inhaler Inhale 2 puffs into the lungs as needed       No current facility-administered medications for this visit.       Past Medical History:   Diagnosis Date    Asthma      Past Surgical History:   Procedure Laterality Date    SHOULDER SURGERY       Right rotator cuff     Family History   Problem Relation Age of Onset    Arthritis Mother     Diabetes Mother     Heart Disease Mother     High Blood Pressure Mother      Social History     Socioeconomic History    Marital status:      Spouse name: Not on file    Number of children: Not on file    Years of education: Not on file    Highest education level: Not on file   Occupational History    Not on file   Tobacco Use    Smoking status: Former     Current packs/day: 0.00     Average packs/day: 0.5 packs/day for 4.8 years (2.4 ttl pk-yrs)     Types: Cigarettes     Start date: 3/1/2011     Quit date: 12/15/2015     Years since quittin.4    Smokeless tobacco: Never    Tobacco comments:     started smoking when i stopped then chewing tobacco   Vaping Use    Vaping status: Never Used   Substance and Sexual Activity    Alcohol use: Not Currently     Comment: rare    Drug use: Never    Sexual activity: Yes     Partners: Female     Comment: sex drive is low.  Energy is low   Other Topics 
Tdap) 03/24/2031    Hepatitis A vaccine  Aged Out    Hib vaccine  Aged Out    Polio vaccine  Aged Out    Meningococcal (ACWY) vaccine  Aged Out    Meningococcal B vaccine  Aged Out    Pneumococcal 0-49 years Vaccine  Discontinued    Diabetes screen  Discontinued             Assessment/Plan:    Obesity  Sleep apnea  Assessment: poorly controlled due to constant BMI >40 and new onset sleep apnea even with patient doing lifestyle interventions (exercise and balanced diet) already.      Plan:  Follow up with sleep medicine on 5/21 and begin treatment for sleep apnea (likely CPAP)   Continue working with the pharmacy to try to get zepbound covered now that patient has a new diagnosis of sleep apnea.      Ear pain:   Ddx: allergies , eczema, eustachian tube dysfunction (less likely due to lack of rhinorrhea or cold symptoms)   Continue monitoring symptoms and use flonase for flare ups  Zyrtec OTC medication if symptoms continue to worsen  Return to clinic in 1 year for next physical, return sooner if patient experiences tinnitus or hearing loss with the ear pain.     Asthma:   Well controlled intermittent asthma as evidenced by <2 exacerbations per month.   Plan:   Continue albuterol sulfate PRN   Return to clinic in 1 year for follow up. Return sooner if asthma symptoms worsen or you are using the inhaler more than twice a month.      Health Maintenance:   Referral for colonoscopy   HIV and hep C screening bloodwork after this visit     Yoana Estrada, M3 student    The above is written by a medical student, please see below for resident/attending attestation and plan.    RESIDENT/ATTENDING ATTESTATION:    After medical student evaluation and exam, I independently gathered patients history, independently did a physical, and agree with A/P as written in medical student's note (other than clarified below).    Please see below for additional information documented by the resident/attending including the A/P.  Assessment &

## 2025-05-12 NOTE — ASSESSMENT & PLAN NOTE
Previous blood work within normal limits.  Most likely secondary to obstructive sleep apnea.  Treatment per sleep medicine.

## 2025-05-12 NOTE — ASSESSMENT & PLAN NOTE
Recent diagnosis of severe obstructive sleep apnea from sleep study.  Follow-up scheduled with sleep medicine on 5/21.

## 2025-05-20 ASSESSMENT — SLEEP AND FATIGUE QUESTIONNAIRES
HOW LIKELY ARE YOU TO NOD OFF OR FALL ASLEEP WHEN YOU ARE A PASSENGER IN A CAR FOR AN HOUR WITHOUT A BREAK: SLIGHT CHANCE OF DOZING
HOW LIKELY ARE YOU TO NOD OFF OR FALL ASLEEP WHILE LYING DOWN TO REST IN THE AFTERNOON WHEN CIRCUMSTANCES PERMIT: MODERATE CHANCE OF DOZING
HOW LIKELY ARE YOU TO NOD OFF OR FALL ASLEEP WHILE LYING DOWN TO REST IN THE AFTERNOON WHEN CIRCUMSTANCES PERMIT: MODERATE CHANCE OF DOZING
HOW LIKELY ARE YOU TO NOD OFF OR FALL ASLEEP IN A CAR, WHILE STOPPED FOR A FEW MINUTES IN TRAFFIC: WOULD NEVER DOZE
HOW LIKELY ARE YOU TO NOD OFF OR FALL ASLEEP WHILE SITTING INACTIVE IN A PUBLIC PLACE: WOULD NEVER DOZE
HOW LIKELY ARE YOU TO NOD OFF OR FALL ASLEEP WHILE SITTING AND TALKING TO SOMEONE: WOULD NEVER DOZE
HOW LIKELY ARE YOU TO NOD OFF OR FALL ASLEEP WHILE SITTING AND TALKING TO SOMEONE: WOULD NEVER DOZE
HOW LIKELY ARE YOU TO NOD OFF OR FALL ASLEEP WHILE SITTING INACTIVE IN A PUBLIC PLACE: WOULD NEVER DOZE
HOW LIKELY ARE YOU TO NOD OFF OR FALL ASLEEP WHILE WATCHING TV: SLIGHT CHANCE OF DOZING
HOW LIKELY ARE YOU TO NOD OFF OR FALL ASLEEP WHILE SITTING AND READING: SLIGHT CHANCE OF DOZING
HOW LIKELY ARE YOU TO NOD OFF OR FALL ASLEEP WHEN YOU ARE A PASSENGER IN A CAR FOR AN HOUR WITHOUT A BREAK: SLIGHT CHANCE OF DOZING
ESS TOTAL SCORE: 5
HOW LIKELY ARE YOU TO NOD OFF OR FALL ASLEEP WHILE SITTING QUIETLY AFTER LUNCH WITHOUT ALCOHOL: WOULD NEVER DOZE
HOW LIKELY ARE YOU TO NOD OFF OR FALL ASLEEP IN A CAR, WHILE STOPPED FOR A FEW MINUTES IN TRAFFIC: WOULD NEVER DOZE
HOW LIKELY ARE YOU TO NOD OFF OR FALL ASLEEP WHILE WATCHING TV: SLIGHT CHANCE OF DOZING
HOW LIKELY ARE YOU TO NOD OFF OR FALL ASLEEP WHILE SITTING AND READING: SLIGHT CHANCE OF DOZING
HOW LIKELY ARE YOU TO NOD OFF OR FALL ASLEEP WHILE SITTING QUIETLY AFTER LUNCH WITHOUT ALCOHOL: WOULD NEVER DOZE

## 2025-05-21 ENCOUNTER — OFFICE VISIT (OUTPATIENT)
Dept: PULMONOLOGY | Age: 55
End: 2025-05-21
Payer: COMMERCIAL

## 2025-05-21 VITALS
RESPIRATION RATE: 20 BRPM | WEIGHT: 315 LBS | OXYGEN SATURATION: 97 % | BODY MASS INDEX: 39.17 KG/M2 | HEART RATE: 76 BPM | HEIGHT: 75 IN

## 2025-05-21 DIAGNOSIS — G47.33 SEVERE OBSTRUCTIVE SLEEP APNEA: Primary | ICD-10-CM

## 2025-05-21 DIAGNOSIS — R53.82 CHRONIC FATIGUE: ICD-10-CM

## 2025-05-21 PROCEDURE — 99214 OFFICE O/P EST MOD 30 MIN: CPT

## 2025-05-21 NOTE — PATIENT INSTRUCTIONS
What's next:  Trying auto-CPAP:  Let our office know to which medical supply company (\"DME company\") we should send the CPAP prescription.  ([facility])  That DME company will run an authorization for CPAP through your insurance and then get you set up with your machine and equipment.    The masks you liked most today were:   #1:  Lopez DreamWear nasal pillows (you preferred small pillows)   #2:  ResMed AirFit P30i (you preferred small pillows); or N30i insert (medium cushion)  #3:  Full Face:  ResMed AirFit F40   After getting home with your machine, try to de-sensitize yourself to the CPAP & mask - people often do better if they try it out during the day and practice breathing with it while focusing on another task, such as reading, playing a game or watching TV.  You can ask your DME for a different mask if what you first tried isn't comfortable.  Often times, DME companies will allow you to trade out a mask if you ask within the first 2-4 weeks.   We prefer nasal mask or nasal pillows instead of full face masks for the treatment of SANIYA.  People who cannot use a nasal interface should change to a full face mask.    Call or message our office if the air pressures are not tolerable.  It is possible we can make adjustments to the settings while keeping your treatment benefit in mind.    After getting set up with CPAP, you must be seen within 31-90 days.  At this appointment, insurance typically needs to see that you are using the device at least 4 hours each night for at least 70% of nights in a month.  Please bring your machine and power cord to this appointment.   Work on sleep hygiene tips listed below.      It was great to see you and take care Brian!  -Ree    ______________________________________________________________________  Never drive a car or operate a motorized vehicle while drowsy or sleepy.   ______________________________________________________________________      Sleep Hygiene...

## 2025-05-21 NOTE — PROGRESS NOTES
SLEEP MEDICINE PROGRESS NOTE  CC: Snoring  Referring Provider: Dr. Gary eGorges & Dr. Lesa Moreno to evaluate for Obstructive Sleep Apnea.    Interval History 5/21/25:  CMT f/u HST  -  Had HST 4/29/25 demonstrating very severe SANIYA with AHI 54.  ESS is: 5.  Pt understands results and is not surprised.  He was expecting to discuss CPAP today.  He found several mask styles that felt tolerable today.  Primarily breathes through nose during the day.  He and his spouse are looking forward to starting treatment and he is hopeful CPAP will help him feel better.      Presenting HPI 4/23/25:   53 yo male retired  presents for SANIYA evaluation due to observed apneas by his wife & chronic fatigue discussed with his PCP. +dry mouth upon awakening worse x a few years.Overall dissatisfied with his sleep quality with difficulty falling & staying asleep. Worked 3rd shift for 17 yrs, transitioned to day shift 8 yrs ago. Inconsistent sleep latency, usually difficult to fall asleep taking 30-45 mins, aided with melatonin. 2-4 awakenings/night, to restroom 1x/night. Frequent tossing & turning due to shoulder pain. To bed ~9p, OOB 5:10 AM. On weekends, to bed 10:30p & sleeps in until at least 8 AM. Maintains a regular gym schedule, exercising 5 days a week. Does not usually feel refreshed upon waking, but discipline gets him out of bed. His ideal is 7 to 8 hours of sleep/night. No RLS. No EDS.  No car wrecks/near wrecks because of the sleepiness or nodding off while driving.  ESS is: 7.   Drinks 3 caffeinated beverages/day (including pre-workout).  Possible family h/o SANIYA undx'd in his father; +RLS in mother.     PHYSICAL EXAM:  Pulse 76, resp. rate 20, height 1.905 m (6' 3\"), weight (!) 157.3 kg (346 lb 12.8 oz), SpO2 97%.'   347# Apr 2025;   Constitutional:  No acute distress. Very pleasant.   HENT:  Oropharynx is clear and moist. Mallampati class III.  Eyes: Pupils equal, round, and reactive to light. No scleral

## 2025-05-22 ENCOUNTER — TELEPHONE (OUTPATIENT)
Dept: PULMONOLOGY | Age: 55
End: 2025-05-22

## 2025-05-22 NOTE — TELEPHONE ENCOUNTER
PAP orders faxed, with testing/OV note/demographics/insurance, to [facility] via [employer] at [phone].  Notified PSS.

## 2025-05-22 NOTE — TELEPHONE ENCOUNTER
Opal from [facility] called stating that she got a P/A request for PAP machine on pt, but they only handle drug requests.  Advised Opal that the request would have came from the DME and to cancel the request.  I sent message to Bettye with [employer] asking her to follow up with the P/A team on this.

## 2025-05-29 ENCOUNTER — PATIENT MESSAGE (OUTPATIENT)
Dept: PRIMARY CARE CLINIC | Age: 55
End: 2025-05-29

## 2025-05-29 NOTE — TELEPHONE ENCOUNTER
Patient is trying to get an update on his Zepbound approval. Please reach out to PA dept for updates.

## 2025-06-23 ENCOUNTER — ANESTHESIA EVENT (OUTPATIENT)
Dept: ENDOSCOPY | Age: 55
End: 2025-06-23
Payer: COMMERCIAL

## 2025-07-07 ENCOUNTER — ANESTHESIA (OUTPATIENT)
Dept: ENDOSCOPY | Age: 55
End: 2025-07-07
Payer: COMMERCIAL

## 2025-07-20 ASSESSMENT — SLEEP AND FATIGUE QUESTIONNAIRES
ESS TOTAL SCORE: 5
HOW LIKELY ARE YOU TO NOD OFF OR FALL ASLEEP WHEN YOU ARE A PASSENGER IN A CAR FOR AN HOUR WITHOUT A BREAK: SLIGHT CHANCE OF DOZING
HOW LIKELY ARE YOU TO NOD OFF OR FALL ASLEEP WHILE WATCHING TV: SLIGHT CHANCE OF DOZING
HOW LIKELY ARE YOU TO NOD OFF OR FALL ASLEEP WHILE SITTING AND READING: SLIGHT CHANCE OF DOZING
HOW LIKELY ARE YOU TO NOD OFF OR FALL ASLEEP WHEN YOU ARE A PASSENGER IN A CAR FOR AN HOUR WITHOUT A BREAK: SLIGHT CHANCE OF DOZING
HOW LIKELY ARE YOU TO NOD OFF OR FALL ASLEEP IN A CAR, WHILE STOPPED FOR A FEW MINUTES IN TRAFFIC: WOULD NEVER DOZE
HOW LIKELY ARE YOU TO NOD OFF OR FALL ASLEEP WHILE SITTING AND READING: SLIGHT CHANCE OF DOZING
HOW LIKELY ARE YOU TO NOD OFF OR FALL ASLEEP WHILE SITTING AND TALKING TO SOMEONE: WOULD NEVER DOZE
HOW LIKELY ARE YOU TO NOD OFF OR FALL ASLEEP WHILE SITTING AND TALKING TO SOMEONE: WOULD NEVER DOZE
HOW LIKELY ARE YOU TO NOD OFF OR FALL ASLEEP WHILE SITTING QUIETLY AFTER LUNCH WITHOUT ALCOHOL: SLIGHT CHANCE OF DOZING
HOW LIKELY ARE YOU TO NOD OFF OR FALL ASLEEP WHILE SITTING INACTIVE IN A PUBLIC PLACE: WOULD NEVER DOZE
HOW LIKELY ARE YOU TO NOD OFF OR FALL ASLEEP WHILE SITTING INACTIVE IN A PUBLIC PLACE: WOULD NEVER DOZE
HOW LIKELY ARE YOU TO NOD OFF OR FALL ASLEEP WHILE WATCHING TV: SLIGHT CHANCE OF DOZING
HOW LIKELY ARE YOU TO NOD OFF OR FALL ASLEEP WHILE LYING DOWN TO REST IN THE AFTERNOON WHEN CIRCUMSTANCES PERMIT: SLIGHT CHANCE OF DOZING
HOW LIKELY ARE YOU TO NOD OFF OR FALL ASLEEP IN A CAR, WHILE STOPPED FOR A FEW MINUTES IN TRAFFIC: WOULD NEVER DOZE
HOW LIKELY ARE YOU TO NOD OFF OR FALL ASLEEP WHILE SITTING QUIETLY AFTER LUNCH WITHOUT ALCOHOL: SLIGHT CHANCE OF DOZING
HOW LIKELY ARE YOU TO NOD OFF OR FALL ASLEEP WHILE LYING DOWN TO REST IN THE AFTERNOON WHEN CIRCUMSTANCES PERMIT: SLIGHT CHANCE OF DOZING

## 2025-07-21 ENCOUNTER — HOSPITAL ENCOUNTER (OUTPATIENT)
Age: 55
Setting detail: OUTPATIENT SURGERY
Discharge: HOME | End: 2025-07-21
Attending: INTERNAL MEDICINE | Admitting: INTERNAL MEDICINE
Payer: COMMERCIAL

## 2025-07-21 VITALS
TEMPERATURE: 98.2 F | RESPIRATION RATE: 18 BRPM | OXYGEN SATURATION: 96 % | BODY MASS INDEX: 39.17 KG/M2 | WEIGHT: 315 LBS | DIASTOLIC BLOOD PRESSURE: 79 MMHG | HEART RATE: 55 BPM | HEIGHT: 75 IN | SYSTOLIC BLOOD PRESSURE: 122 MMHG

## 2025-07-21 DIAGNOSIS — Z12.11 SPECIAL SCREENING FOR MALIGNANT NEOPLASMS, COLON: ICD-10-CM

## 2025-07-21 PROCEDURE — 88305 TISSUE EXAM BY PATHOLOGIST: CPT

## 2025-07-21 PROCEDURE — 3609027000 HC COLONOSCOPY: Performed by: INTERNAL MEDICINE

## 2025-07-21 PROCEDURE — 2580000003 HC RX 258: Performed by: ANESTHESIOLOGY

## 2025-07-21 PROCEDURE — 7100000011 HC PHASE II RECOVERY - ADDTL 15 MIN: Performed by: INTERNAL MEDICINE

## 2025-07-21 PROCEDURE — 3700000001 HC ADD 15 MINUTES (ANESTHESIA): Performed by: INTERNAL MEDICINE

## 2025-07-21 PROCEDURE — 6360000002 HC RX W HCPCS: Performed by: NURSE ANESTHETIST, CERTIFIED REGISTERED

## 2025-07-21 PROCEDURE — 3700000000 HC ANESTHESIA ATTENDED CARE: Performed by: INTERNAL MEDICINE

## 2025-07-21 PROCEDURE — 2709999900 HC NON-CHARGEABLE SUPPLY: Performed by: INTERNAL MEDICINE

## 2025-07-21 PROCEDURE — 7100000010 HC PHASE II RECOVERY - FIRST 15 MIN: Performed by: INTERNAL MEDICINE

## 2025-07-21 RX ORDER — PROPOFOL 10 MG/ML
INJECTION, EMULSION INTRAVENOUS
Status: DISCONTINUED | OUTPATIENT
Start: 2025-07-21 | End: 2025-07-21 | Stop reason: SDUPTHER

## 2025-07-21 RX ORDER — SODIUM CHLORIDE 0.9 % (FLUSH) 0.9 %
5-40 SYRINGE (ML) INJECTION PRN
Status: DISCONTINUED | OUTPATIENT
Start: 2025-07-21 | End: 2025-07-21 | Stop reason: HOSPADM

## 2025-07-21 RX ORDER — SODIUM CHLORIDE 0.9 % (FLUSH) 0.9 %
5-40 SYRINGE (ML) INJECTION EVERY 12 HOURS SCHEDULED
Status: DISCONTINUED | OUTPATIENT
Start: 2025-07-21 | End: 2025-07-21 | Stop reason: HOSPADM

## 2025-07-21 RX ORDER — LIDOCAINE HYDROCHLORIDE 20 MG/ML
INJECTION, SOLUTION INFILTRATION; PERINEURAL
Status: DISCONTINUED | OUTPATIENT
Start: 2025-07-21 | End: 2025-07-21 | Stop reason: SDUPTHER

## 2025-07-21 RX ORDER — SODIUM CHLORIDE 9 MG/ML
INJECTION, SOLUTION INTRAVENOUS PRN
Status: DISCONTINUED | OUTPATIENT
Start: 2025-07-21 | End: 2025-07-21 | Stop reason: HOSPADM

## 2025-07-21 RX ORDER — SODIUM CHLORIDE, SODIUM LACTATE, POTASSIUM CHLORIDE, CALCIUM CHLORIDE 600; 310; 30; 20 MG/100ML; MG/100ML; MG/100ML; MG/100ML
INJECTION, SOLUTION INTRAVENOUS CONTINUOUS
Status: DISCONTINUED | OUTPATIENT
Start: 2025-07-21 | End: 2025-07-21 | Stop reason: HOSPADM

## 2025-07-21 RX ORDER — MIDAZOLAM HYDROCHLORIDE 1 MG/ML
INJECTION, SOLUTION INTRAMUSCULAR; INTRAVENOUS
Status: DISCONTINUED | OUTPATIENT
Start: 2025-07-21 | End: 2025-07-21 | Stop reason: SDUPTHER

## 2025-07-21 RX ADMIN — MIDAZOLAM 2 MG: 1 INJECTION INTRAMUSCULAR; INTRAVENOUS at 11:05

## 2025-07-21 RX ADMIN — SODIUM CHLORIDE, POTASSIUM CHLORIDE, SODIUM LACTATE AND CALCIUM CHLORIDE: 600; 310; 30; 20 INJECTION, SOLUTION INTRAVENOUS at 11:00

## 2025-07-21 RX ADMIN — PROPOFOL 50 MG: 10 INJECTION, EMULSION INTRAVENOUS at 11:22

## 2025-07-21 RX ADMIN — LIDOCAINE HYDROCHLORIDE 60 MG: 20 INJECTION, SOLUTION INFILTRATION; PERINEURAL at 11:01

## 2025-07-21 RX ADMIN — PROPOFOL 150 MG: 10 INJECTION, EMULSION INTRAVENOUS at 11:01

## 2025-07-21 RX ADMIN — PROPOFOL 50 MG: 10 INJECTION, EMULSION INTRAVENOUS at 11:24

## 2025-07-21 RX ADMIN — PROPOFOL 50 MG: 10 INJECTION, EMULSION INTRAVENOUS at 11:20

## 2025-07-21 RX ADMIN — PROPOFOL 250 MG: 10 INJECTION, EMULSION INTRAVENOUS at 11:08

## 2025-07-21 RX ADMIN — PROPOFOL 50 MG: 10 INJECTION, EMULSION INTRAVENOUS at 11:17

## 2025-07-21 ASSESSMENT — PAIN - FUNCTIONAL ASSESSMENT: PAIN_FUNCTIONAL_ASSESSMENT: 0-10

## 2025-07-21 NOTE — ANESTHESIA POSTPROCEDURE EVALUATION
Department of Anesthesiology  Postprocedure Note    Patient: Brian Brewer  MRN: [id number]  YOB: 1970  Date of evaluation: 7/21/2025    Procedure Summary       Date: 07/21/25 Room / Location: [place] [address] [facility]    Anesthesia Start: 1100 Anesthesia Stop: 1130    Procedure: COLON W/ANES. Diagnosis:       Special screening for malignant neoplasms, colon      (Special screening for malignant neoplasms, colon [Z12.11])    Surgeons: Laura Chou MD Responsible Provider: Sven Zafar MD    Anesthesia Type: MAC ASA Status: 3            Anesthesia Type: No value filed.    Jl Phase I: Jl Score: 10    Jl Phase II: Jl Score: 10    Anesthesia Post Evaluation    Comments: Anes Post-op Note    Name:    Brian Brewer  MRN:      [id number]    Patient Vitals in the past 12 hrs:  07/21/25 1130, BP:117/68, Temp:98.2 °F (36.8 °C), Temp src:Oral, Pulse:71, Resp:14, SpO2:95 %  07/21/25 0930, BP:133/70, Temp:97.3 °F (36.3 °C), Temp src:Tympanic, Pulse:58, Resp:16, SpO2:98 %     LABS:    CBC  Lab Results       Component                Value               Date/Time                  WBC                      7.7                 04/14/2025 08:11 AM        HGB                      16.2                04/14/2025 08:11 AM        HCT                      47.4                04/14/2025 08:11 AM        PLT                      233                 04/14/2025 08:11 AM   RENAL  Lab Results       Component                Value               Date/Time                  NA                       139                 04/14/2025 08:11 AM        K                        4.1                 04/14/2025 08:11 AM        CL                       104                 04/14/2025 08:11 AM        CO2                      26                  04/14/2025 08:11 AM        BUN                      17                  04/14/2025 08:11 AM        CREATININE               1.2                 04/14/2025 08:11 AM     
Not on file     [ X ] affect on work ability: patient is on disability due to this problem.      Follow up 6 weeks for Botox

## 2025-07-21 NOTE — H&P
Gastroenterology Note             Pre-operative History and Physical    Patient: Brian Brewer  : 1970  CSN:     History Obtained From:  patient and/or guardian.     HISTORY OF PRESENT ILLNESS:    The patient is a 54 y.o. male  here for colonoscopy.     Past Medical History:    Past Medical History:   Diagnosis Date    Asthma     Sleep apnea      Past Surgical History:    Past Surgical History:   Procedure Laterality Date    LUMBAR EPIDURAL INJECTION      SHOULDER SURGERY       Right rotator cuff     Medications Prior to Admission:   No current facility-administered medications on file prior to encounter.     Current Outpatient Medications on File Prior to Encounter   Medication Sig Dispense Refill    Misc. Devices (CPAP MACHINE) MISC by Does not apply route      albuterol sulfate HFA (PROVENTIL;VENTOLIN;PROAIR) 108 (90 Base) MCG/ACT inhaler Inhale 2 puffs into the lungs as needed      tirzepatide-weight management (ZEPBOUND) 2.5 MG/0.5ML SOAJ subCUTAneous auto-injector pen Inject 2.5 mg into the skin every 7 days (Patient not taking: Reported on 2025) 2 mL 0        Allergies:  Penicillins      Social History:   Social History     Tobacco Use    Smoking status: Former     Current packs/day: 0.00     Average packs/day: 0.5 packs/day for 4.8 years (2.4 ttl pk-yrs)     Types: Cigarettes     Start date: 3/1/2011     Quit date: 12/15/2015     Years since quittin.6    Smokeless tobacco: Never    Tobacco comments:     started smoking when i stopped then chewing tobacco   Substance Use Topics    Alcohol use: Not Currently     Comment: rare     Family History:   Family History   Problem Relation Age of Onset    Arthritis Mother     Diabetes Mother     Heart Disease Mother     High Blood Pressure Mother        PHYSICAL EXAM:      /70   Pulse 58   Temp 97.3 °F (36.3 °C) (Tympanic)   Resp 16   Ht 1.905 m (6' 3\")   Wt (!) 156.9 kg (346 lb)   SpO2 98%   BMI 43.25 kg/m²  I        Heart:   RRR,

## 2025-07-21 NOTE — DISCHARGE INSTRUCTIONS
PATIENT INSTRUCTIONS  POST-SEDATION    Brian Brewer          IMMEDIATELY FOLLOWING PROCEDURE:    Do not drive or operate machinery for the first twenty four hours after surgery.     Do not make any important decisions for twenty four hours after surgery or while taking narcotic pain medications or sedatives.     You should NOT BE LEFT UNATTENDED OR ALONE. A responsible adult should be with you for the rest of the day of your procedure and also during the night for your protection and safety.    You may experience some light headedness. Rest at home with activity as tolerated. You may not need to go to bed, but it is important to rest for the next 24 hours. You should not engage in athletic sports such as basketball, volleyball, jogging, skating, or activities requiring refined motor skills for 24 hours.   If you develop intractable nausea and vomiting or a severe headache please notify your doctor immediately.   You are not expected to have any fever, but if you feel warm, take your temperature. If you have a fever 101 degrees or higher, call your doctor.     If you have had an Endoscopy:   *Eat lightly for your first meal and gradually resume your normal / prescribed diet. DO NOT eat or drink until your gag reflex returns.   *If you have had a colonoscopy, do not expect a normal bowel movement for approximately three days due to the cleansing of the large intestine prior to colonoscopy.    ONCE YOU ARE HOME, IF YOU SHOULD HAVE:  Difficulty in breathing, persistent nausea or vomiting, bleeding you feel is excessive, or pain that is unusual, increased abdominal bloating, or any swelling, fever / chills, call your physician. If you cannot contact your physician, but feel that your signs and symptoms need a physician's attention, go to the Emergency Department.      FOLLOW-UP:    Please follow up with your primary care provider as scheduled or needed.    Dr. Chou's office will call you with the biopsy

## 2025-07-21 NOTE — ANESTHESIA PRE PROCEDURE
Department of Anesthesiology  Preprocedure Note       Name:  Brian Brewer   Age:  54 y.o.  :  1970                                          MRN:  [id number]         Date:  2025      Surgeon: Surgeon(s):  Laura Chou MD    Procedure: Procedure(s):  COLON W/ANES.    Medications prior to admission:   Prior to Admission medications    Medication Sig Start Date End Date Taking? Authorizing Provider   Misc. Devices (CPAP MACHINE) MISC by Does not apply route   Yes Provider, MD Rossi   albuterol sulfate HFA (PROVENTIL;VENTOLIN;PROAIR) 108 (90 Base) MCG/ACT inhaler Inhale 2 puffs into the lungs as needed   Yes Provider, MD Rossi   tirzepatide-weight management (ZEPBOUND) 2.5 MG/0.5ML SOAJ subCUTAneous auto-injector pen Inject 2.5 mg into the skin every 7 days  Patient not taking: Reported on 2025   Gary Georges DO       Current medications:    Current Facility-Administered Medications   Medication Dose Route Frequency Provider Last Rate Last Admin   • famotidine (PEPCID) 20 MG/2ML 20 mg in sodium chloride (PF) 0.9 % 10 mL injection  20 mg IntraVENous Once Sb Og MD       • lactated ringers infusion   IntraVENous Continuous Sb Og MD       • sodium chloride flush 0.9 % injection 5-40 mL  5-40 mL IntraVENous 2 times per day Sb Og MD       • sodium chloride flush 0.9 % injection 5-40 mL  5-40 mL IntraVENous PRN Sb Og MD       • 0.9 % sodium chloride infusion   IntraVENous PRN Sb Og MD           Allergies:    Allergies   Allergen Reactions   • Penicillins        Problem List:    Patient Active Problem List   Diagnosis Code   • Snoring R06.83   • Chronic fatigue R53.82   • Witnessed apneic spells R06.81       Past Medical History:        Diagnosis Date   • Asthma    • Sleep apnea        Past Surgical History:        Procedure Laterality Date   • LUMBAR EPIDURAL INJECTION     • SHOULDER

## 2025-07-23 ENCOUNTER — OFFICE VISIT (OUTPATIENT)
Dept: PULMONOLOGY | Age: 55
End: 2025-07-23
Payer: COMMERCIAL

## 2025-07-23 VITALS
SYSTOLIC BLOOD PRESSURE: 130 MMHG | HEIGHT: 75 IN | WEIGHT: 315 LBS | OXYGEN SATURATION: 96 % | BODY MASS INDEX: 39.17 KG/M2 | RESPIRATION RATE: 18 BRPM | HEART RATE: 67 BPM | DIASTOLIC BLOOD PRESSURE: 79 MMHG

## 2025-07-23 DIAGNOSIS — M19.011 OSTEOARTHRITIS OF BOTH SHOULDERS, UNSPECIFIED OSTEOARTHRITIS TYPE: ICD-10-CM

## 2025-07-23 DIAGNOSIS — G47.33 SEVERE OBSTRUCTIVE SLEEP APNEA: Primary | ICD-10-CM

## 2025-07-23 DIAGNOSIS — M19.012 OSTEOARTHRITIS OF BOTH SHOULDERS, UNSPECIFIED OSTEOARTHRITIS TYPE: ICD-10-CM

## 2025-07-23 PROCEDURE — 99213 OFFICE O/P EST LOW 20 MIN: CPT

## 2025-07-23 NOTE — PROGRESS NOTES
SLEEP MEDICINE PROGRESS NOTE  CC: Snoring  Referring Provider: Dr. Gary Georges & Dr. Lesa Moreno to evaluate for Obstructive Sleep Apnea.    Interval History 7/23/25:  31-90  -  Set up with AirSense 10 5/29/25.  Doing pretty well on CPAP.  Has acclimated to it pretty well now.  Sleep is more consolidated, less tossing and turning (which has actually unfortunately increased his shoulder pain) and rarely ever gets up to the restroom now.  Using original nasal pillow mask as planned in office and doing ok with this.  Contacted office for air pressures to be increased. Changed to APAP 6-15.  However, still feels they are a little too low and constantly adjusting mask d/t feeling he is not getting enough air.  No difference in how he feels upon waking or through the day.  Spouse is happy with treatment, much more quiet sleep. Reports he feels the same during the daytime and that he never had trouble waking up or being awake through the day; presenting HPI does note feeling unrefreshed upon waking and chronic fatigue through the daytime.   -  SANIYA treated with benefit with APAP 6-15; used 7:57 hrs avg with compliance >4 hour use 30/30 days, residual AHI 0.7.  Pressures: median 6.6, 95th% 8.1, max 9.1.  ESS: 5.       Interval History 5/21/25:  CMT f/u HST  -  Had HST 4/29/25 demonstrating very severe SANIYA with AHI 54.  ESS is: 5.  Pt understands results and is not surprised.  He was expecting to discuss CPAP today.  He found several mask styles that felt tolerable today.  Primarily breathes through nose during the day.  He and his spouse are looking forward to starting treatment and he is hopeful CPAP will help him feel better.      Presenting HPI 4/23/25:   53 yo male retired  presents for SANIYA evaluation due to observed apneas by his wife & chronic fatigue discussed with his PCP. +dry mouth upon awakening worse x a few years.Overall dissatisfied with his sleep quality with difficulty falling &

## 2025-07-23 NOTE — PATIENT INSTRUCTIONS
I'm increasing your minimum pressure from 6 to 8 today -- let me know if you'd like this increased again.     It was great to see you again and take care Brian!  -Ree      Never drive a car or operate a motorized vehicle while drowsy or sleepy.       Sleep Hygiene... Important practices for better sleep:  Avoid naps. This will ensure you are sleepy at bedtime.  If you have to take a nap, sleep 30 minutes or less before 3 pm.  Have a fixed bedtime and awakening time. The human body thrives on routines. Only deviate from these set times by no more than 1 hour, including on weekends.  Avoid exposure to electronics/screens within 2 hours of your desired sleep time. Light from screens inhibits melatonin production which stops our brain from helping us get to sleep.   Go to bed only when sleepy; this reduces the time you are awake in bed (which can lead to frustration and negative thoughts about sleep). If you can't fall asleep within 15-30 minutes, get up and do something boring until you feel sleepy again. Absolutely no electronic screens during this time.    Only use your bed for sleeping & intimacy. Do not use your bed as an office, workroom or recreation room.    Develop sleep rituals that you go through the same way every night.  Stay away from stimulants such as caffeine and nicotine. Caffeine can remain in your system for well over 10 hours, so no caffeine after lunch. Caffeine is found in tea, cola, coffee, cocoa and chocolate.    Avoid alcohol 2-4 hours before bedtime. As alcohol is metabolized, the result is a stimulant or \"wake-up\" effect and will cause fragmented sleep.   Regular exercise is recommended to help you deepen your sleep (and MANY other reasons), but timing is important--aim to exercise in the morning or early afternoon, not within 4 hours of your bedtime.   Don’t take worries to bed. Leave worries about life, work, school etc. behind you when you go to bed.    Ensure your bedroom is quiet

## 2025-07-24 NOTE — PROGRESS NOTES
MA Communication:  The following orders are received by verbal communication from Ree Yeung PA-C.     Orders include:      Office note faxed to [facility]     1 year fu scheduled for 7.22.2026

## 2025-07-28 DIAGNOSIS — R06.83 SNORING: ICD-10-CM

## 2025-07-28 DIAGNOSIS — G47.33 SEVERE OBSTRUCTIVE SLEEP APNEA: Primary | ICD-10-CM

## 2025-07-28 DIAGNOSIS — R06.81 WITNESSED APNEIC SPELLS: ICD-10-CM

## (undated) DEVICE — ELECTRODE,RADIOTRANSLUCENT,FOAM,3PK: Brand: MEDLINE

## (undated) DEVICE — ENDO CARRY-ON PROCEDURE KIT INCLUDES SUCTION TUBING, LUBRICANT, GAUZE, BIOHAZARD STICKER, TRANSPORT PAD AND INTERCEPT BEDSIDE KIT.: Brand: ENDO CARRY-ON PROCEDURE KIT

## (undated) DEVICE — TRAP POLYP ETRAP

## (undated) DEVICE — SNARE ENDOSCP POLYP 2.4 MM 240 CM 10 MM 2.8 MM CAPTIVATOR